# Patient Record
Sex: FEMALE | Race: WHITE | NOT HISPANIC OR LATINO
[De-identification: names, ages, dates, MRNs, and addresses within clinical notes are randomized per-mention and may not be internally consistent; named-entity substitution may affect disease eponyms.]

---

## 2017-06-22 ENCOUNTER — APPOINTMENT (OUTPATIENT)
Dept: CARDIOLOGY | Facility: CLINIC | Age: 82
End: 2017-06-22

## 2017-07-13 ENCOUNTER — APPOINTMENT (OUTPATIENT)
Dept: CARDIOLOGY | Facility: CLINIC | Age: 82
End: 2017-07-13

## 2017-10-19 ENCOUNTER — APPOINTMENT (OUTPATIENT)
Dept: CARDIOLOGY | Facility: CLINIC | Age: 82
End: 2017-10-19
Payer: MEDICARE

## 2017-10-19 PROCEDURE — 99214 OFFICE O/P EST MOD 30 MIN: CPT

## 2018-05-14 ENCOUNTER — RECORD ABSTRACTING (OUTPATIENT)
Age: 83
End: 2018-05-14

## 2018-05-16 ENCOUNTER — RECORD ABSTRACTING (OUTPATIENT)
Age: 83
End: 2018-05-16

## 2018-05-16 ENCOUNTER — APPOINTMENT (OUTPATIENT)
Dept: CARDIOLOGY | Facility: CLINIC | Age: 83
End: 2018-05-16
Payer: MEDICARE

## 2018-05-16 ENCOUNTER — NON-APPOINTMENT (OUTPATIENT)
Age: 83
End: 2018-05-16

## 2018-05-16 VITALS — DIASTOLIC BLOOD PRESSURE: 80 MMHG | SYSTOLIC BLOOD PRESSURE: 130 MMHG

## 2018-05-16 VITALS
HEART RATE: 60 BPM | BODY MASS INDEX: 21.86 KG/M2 | HEIGHT: 66 IN | WEIGHT: 136 LBS | SYSTOLIC BLOOD PRESSURE: 140 MMHG | DIASTOLIC BLOOD PRESSURE: 84 MMHG | OXYGEN SATURATION: 97 %

## 2018-05-16 DIAGNOSIS — Z87.891 PERSONAL HISTORY OF NICOTINE DEPENDENCE: ICD-10-CM

## 2018-05-16 DIAGNOSIS — Z87.898 PERSONAL HISTORY OF OTHER SPECIFIED CONDITIONS: ICD-10-CM

## 2018-05-16 DIAGNOSIS — Z83.49 FAMILY HISTORY OF OTHER ENDOCRINE, NUTRITIONAL AND METABOLIC DISEASES: ICD-10-CM

## 2018-05-16 DIAGNOSIS — Z78.9 OTHER SPECIFIED HEALTH STATUS: ICD-10-CM

## 2018-05-16 DIAGNOSIS — Z87.19 PERSONAL HISTORY OF OTHER DISEASES OF THE DIGESTIVE SYSTEM: ICD-10-CM

## 2018-05-16 PROCEDURE — 99214 OFFICE O/P EST MOD 30 MIN: CPT

## 2018-05-16 PROCEDURE — 93000 ELECTROCARDIOGRAM COMPLETE: CPT

## 2018-10-09 ENCOUNTER — APPOINTMENT (OUTPATIENT)
Dept: CARDIOLOGY | Facility: CLINIC | Age: 83
End: 2018-10-09
Payer: MEDICARE

## 2018-10-09 VITALS
SYSTOLIC BLOOD PRESSURE: 130 MMHG | BODY MASS INDEX: 22.5 KG/M2 | HEART RATE: 60 BPM | WEIGHT: 140 LBS | DIASTOLIC BLOOD PRESSURE: 70 MMHG | HEIGHT: 66 IN

## 2018-10-09 PROCEDURE — 99214 OFFICE O/P EST MOD 30 MIN: CPT

## 2019-05-08 ENCOUNTER — APPOINTMENT (OUTPATIENT)
Dept: CARDIOLOGY | Facility: CLINIC | Age: 84
End: 2019-05-08

## 2019-05-22 ENCOUNTER — APPOINTMENT (OUTPATIENT)
Dept: CARDIOLOGY | Facility: CLINIC | Age: 84
End: 2019-05-22

## 2019-07-11 ENCOUNTER — APPOINTMENT (OUTPATIENT)
Dept: CARDIOLOGY | Facility: CLINIC | Age: 84
End: 2019-07-11
Payer: MEDICARE

## 2019-07-11 ENCOUNTER — NON-APPOINTMENT (OUTPATIENT)
Age: 84
End: 2019-07-11

## 2019-07-11 VITALS
WEIGHT: 138 LBS | DIASTOLIC BLOOD PRESSURE: 76 MMHG | BODY MASS INDEX: 21.66 KG/M2 | OXYGEN SATURATION: 97 % | SYSTOLIC BLOOD PRESSURE: 154 MMHG | HEIGHT: 67 IN | HEART RATE: 68 BPM

## 2019-07-11 VITALS — SYSTOLIC BLOOD PRESSURE: 132 MMHG | DIASTOLIC BLOOD PRESSURE: 80 MMHG

## 2019-07-11 PROCEDURE — 93000 ELECTROCARDIOGRAM COMPLETE: CPT

## 2019-07-11 PROCEDURE — 99214 OFFICE O/P EST MOD 30 MIN: CPT

## 2019-07-11 NOTE — HISTORY OF PRESENT ILLNESS
[FreeTextEntry1] : Medical History:\par Anxiety\par GERD\par shortness of breath\par Chest Pain\par borderline htn on life style modification\par hyperlipidemia not on statin. her choice.

## 2019-07-11 NOTE — DISCUSSION/SUMMARY
[FreeTextEntry1] : #1 Intermittent dizziness,with orthostasis.\par Increase fluid intake.\par CBC, CMP, TSH.\par Echocardiogram and carotid Doppler study.\par Avoid quick changes in position.\par Continuing anxiety/stress management.\par Continue to have comfort with her dog\par  evaluation, management with primary care physician in psychologist\par #2 essential hypertension. Borderline.Orthostasis noted. I have not added any other medications at present to avoid any fall, and associated morbid rheumatology. We will treat with review it after about tests in 2-3 months to decide about further management.\par Continue increased fluid intake.\par Continue salt and alcohol restriction.Understands relationship with hypertension and cardiovascular disease.\par #3 hyperlipidemia.   she Has decided again statin therapy.  we can discuss the risk and benefits again.\par \par \par All the above were at length reviewed. Answered all the questions. Thank you very much for this kind referral. Please do not hesitate to give me a call for any question.\par Part of this transcription was done with voice recognition software and phonetically similar errors are common. I apologize for that. Please donot hesitate to call for any questions due to above.\par Followup is planned otherwise in 6 months. Mucosal Advancement Flap Text: Given the location of the defect, shape of the defect and the proximity to free margins a mucosal advancement flap was deemed most appropriate. Incisions were made with a 15 blade scalpel in the appropriate fashion along the cutaneous vermillion border and the mucosal lip. The remaining actinically damaged mucosal tissue was excised.  The mucosal advancement flap was then elevated to the gingival sulcus with care taken to preserve the neurovascular structures and advanced into the primary defect. Care was taken to ensure that precise realignment of the vermilion border was achieved.

## 2019-07-11 NOTE — ASSESSMENT
[FreeTextEntry1] : past tests for reference:\par \par Nuclear stress test on May 27, 2014, no evidence of ischemia by EKG, normal myocardial perfusion and wall motion, no significant changes from the prior study of July 27, 2012.\par \par The patient was fitted with a 30-day event monitor to identify the arrhythmia burden. This was completed and revealed one episode of nonsustained ventricular tachycardia at the rate of 118 times 6 beats as well as one episode of paroxysmal supraventricular tachycardia also 5 to 6 beats, which was asymptomatic for the patient. There were otherwise isolated PVCs and APCs of no significance. The patient did not have any symptoms accompanying her nonsustained VT or PSVT. The monitoring period was performed between May 22, 2014 and June 23, 2014.\par \par 7/13/17 Carotid Doppler study mild atherosclerosis. Nonobstructive.\par 7/13/17 Echocardiogram preserved ejection fraction trace to mild mitral regurgitation. Normal chamber dimensions.\par 7/13/17 Abdominal aortic ultrasound. No aneurysm.\par \par Reviewed on May 16, 2018.\par EKG ordered and interpreted by me. May 16, 2018. Indication dizziness. Hypertension. Interpretation. In normal sinus rhythm. Incomplete right bundle branch block.\par \par Reviewed on July 11, 2019\par EKG normal sinus rhythm. Left anterior hemiblock, incomplete right bundle branch block.

## 2019-07-11 NOTE — PHYSICAL EXAM
[General Appearance - Well Developed] : well developed [Normal Appearance] : normal appearance [Well Groomed] : well groomed [General Appearance - Well Nourished] : well nourished [No Deformities] : no deformities [General Appearance - In No Acute Distress] : no acute distress [Normal Conjunctiva] : the conjunctiva exhibited no abnormalities [No Oral Pallor] : no oral pallor [Eyelids - No Xanthelasma] : the eyelids demonstrated no xanthelasmas [Normal Jugular Venous A Waves Present] : normal jugular venous A waves present [Normal Jugular Venous V Waves Present] : normal jugular venous V waves present [No Jugular Venous Cooper A Waves] : no jugular venous cooper A waves [] : no respiratory distress [Exaggerated Use Of Accessory Muscles For Inspiration] : no accessory muscle use [Respiration, Rhythm And Depth] : normal respiratory rhythm and effort [Auscultation Breath Sounds / Voice Sounds] : lungs were clear to auscultation bilaterally [Arterial Pulses Normal] : the arterial pulses were normal [Heart Sounds] : normal S1 and S2 [Heart Rate And Rhythm] : heart rate and rhythm were normal [Veins - Varicosity Changes] : no varicosital changes were noted in the lower extremities [Edema] : no peripheral edema present [Abdomen Soft] : soft [Abnormal Walk] : normal gait [Nail Clubbing] : no clubbing of the fingernails [Oriented To Time, Place, And Person] : oriented to person, place, and time [Cyanosis, Localized] : no localized cyanosis [Skin Color & Pigmentation] : normal skin color and pigmentation [Affect] : the affect was normal [Mood] : the mood was normal [No Anxiety] : not feeling anxious [FreeTextEntry1] : abimbola 1-2/6 at the base, no gallop/rub/heave or click. no bruits.

## 2019-07-11 NOTE — REVIEW OF SYSTEMS
[Dizziness] : dizziness [Negative] : Endocrine [Tremor] : no tremor was seen [Numbness (Hypesthesia)] : no numbness [Tingling (Paresthesia)] : no tingling [Convulsions] : no convulsions [see HPI] : see HPI

## 2019-07-11 NOTE — REASON FOR VISIT
[Follow-Up - Clinic] : a clinic follow-up of [Dizziness] : dizziness [Hyperlipidemia] : hyperlipidemia [Hypertension] : hypertension [FreeTextEntry1] : 87-year-old female comes in for urgent consultation in presence of intermittent mild dizziness mainly in the morning hours after breakfast without any associated vertigo, nausea, palpitations, visual disturbances, focal weakness, near syncopal or syncopal event.\par Loss of a few seconds resolves on its own. She does drink 4-6 glasses of water a day.\par She is very anxious about her multiple social situations.\par She is using her dog as her comfort and anxiety management to which is working according to her.\par  No weight loss. No PND. No orthopnea.\par She has been active\par Stable. Weight.\par No recent hospital admit

## 2019-08-07 ENCOUNTER — APPOINTMENT (OUTPATIENT)
Dept: CARDIOLOGY | Facility: CLINIC | Age: 84
End: 2019-08-07
Payer: MEDICARE

## 2019-08-07 ENCOUNTER — NON-APPOINTMENT (OUTPATIENT)
Age: 84
End: 2019-08-07

## 2019-08-07 VITALS
OXYGEN SATURATION: 99 % | WEIGHT: 139 LBS | HEIGHT: 67.25 IN | HEART RATE: 58 BPM | BODY MASS INDEX: 21.56 KG/M2 | SYSTOLIC BLOOD PRESSURE: 120 MMHG | DIASTOLIC BLOOD PRESSURE: 74 MMHG

## 2019-08-07 PROCEDURE — 0296T: CPT

## 2019-08-07 PROCEDURE — 99214 OFFICE O/P EST MOD 30 MIN: CPT

## 2019-08-07 NOTE — PHYSICAL EXAM
[Normal Appearance] : normal appearance [General Appearance - Well Developed] : well developed [Well Groomed] : well groomed [No Deformities] : no deformities [General Appearance - Well Nourished] : well nourished [General Appearance - In No Acute Distress] : no acute distress [Normal Conjunctiva] : the conjunctiva exhibited no abnormalities [No Oral Pallor] : no oral pallor [Eyelids - No Xanthelasma] : the eyelids demonstrated no xanthelasmas [Normal Jugular Venous V Waves Present] : normal jugular venous V waves present [No Jugular Venous Cooper A Waves] : no jugular venous cooper A waves [Normal Jugular Venous A Waves Present] : normal jugular venous A waves present [Respiration, Rhythm And Depth] : normal respiratory rhythm and effort [] : no respiratory distress [Auscultation Breath Sounds / Voice Sounds] : lungs were clear to auscultation bilaterally [Exaggerated Use Of Accessory Muscles For Inspiration] : no accessory muscle use [Heart Rate And Rhythm] : heart rate and rhythm were normal [Heart Sounds] : normal S1 and S2 [Arterial Pulses Normal] : the arterial pulses were normal [Edema] : no peripheral edema present [Veins - Varicosity Changes] : no varicosital changes were noted in the lower extremities [Abdomen Soft] : soft [Abnormal Walk] : normal gait [Nail Clubbing] : no clubbing of the fingernails [Cyanosis, Localized] : no localized cyanosis [Oriented To Time, Place, And Person] : oriented to person, place, and time [Affect] : the affect was normal [Skin Color & Pigmentation] : normal skin color and pigmentation [No Anxiety] : not feeling anxious [Mood] : the mood was normal [FreeTextEntry1] : abimbola 1-2/6 at the base, no gallop/rub/heave or click. no bruits.

## 2019-08-07 NOTE — REASON FOR VISIT
[Follow-Up - Clinic] : a clinic follow-up of [Dizziness] : dizziness [Hypertension] : hypertension [Hyperlipidemia] : hyperlipidemia [FreeTextEntry1] : 87-year-old female comes in for urgent consultation in presence of intermittent mild dizziness mainly in the morning hours after breakfast without any associated vertigo, nausea, palpitations, visual disturbances, focal weakness, near syncopal or syncopal event. Patient was evaluated at Dr. Smith's office on Aug 3, 2019 for similar symptoms and with Dr. Llanes on July 11, 2019. She was advised to have labs and cardiac testing which has not been completed yet. \par \par Today she denies chest pain, pressure, unusual shortness of breath, lightheadedness, dizziness, near syncope or syncope. \par \par She is questing if she drinks enough fluid throughout the day.\par She is very anxious about her multiple social situations.\par She is using her dog as her comfort and anxiety management to which is working according to her.\par  No weight loss. No PND. No orthopnea.\par She has been active\par Stable. Weight.\par No recent hospital admit\par \par There is no prior history of myocardial infarction, coronary revascularization, history of ischemic heart disease, or symptomatic congestive heart failure. There is no history of symptomatic arrhythmias including atrial fibrillation.

## 2019-08-07 NOTE — REVIEW OF SYSTEMS
[Dizziness] : dizziness [see HPI] : see HPI [Negative] : Heme/Lymph [Tremor] : no tremor was seen [Numbness (Hypesthesia)] : no numbness [Convulsions] : no convulsions [Tingling (Paresthesia)] : no tingling

## 2019-08-07 NOTE — DISCUSSION/SUMMARY
[FreeTextEntry1] : #1 Intermittent dizziness. On my examination BP sitting and standing 140/80.\par No evidence of orthostatic hypotension. \par Increase fluid intake.\par Once again it has been recommended CBC, CMP, TSH.\par Echocardiogram and carotid Doppler study as previously recommended. \par Avoid quick changes in position.\par Continuing anxiety/stress management.\par Continue to have comfort with her dog\par  evaluation, management with primary care physician in psychologist\par \par #2 essential hypertension. Borderline.Orthostasis noted. I have not added any other medications at present to avoid any fall, and associated morbid rheumatology. As per last note it will be addressed after tests in 2-3 months to decide about further management.\par Continue increased fluid intake.\par Continue salt and alcohol restriction.Understands relationship with hypertension and cardiovascular disease.\par \par #3 hyperlipidemia.   she Has decided again statin therapy.  we can discuss the risk and benefits again.\par \par Above cardiovascular testing reviewed. \par \par Red flag symptoms which would warrant sooner emergent evaluation reviewed with the patient. \par Questions and concerns were addressed and answered. \par \par Sincerely,\par \par Tiffany Manning PA-C\par Patients history, testing and plan reviewed with supervising MD: Dr. Arley Llanes

## 2019-08-26 ENCOUNTER — APPOINTMENT (OUTPATIENT)
Dept: CARDIOLOGY | Facility: CLINIC | Age: 84
End: 2019-08-26
Payer: MEDICARE

## 2019-08-26 PROCEDURE — 93306 TTE W/DOPPLER COMPLETE: CPT

## 2019-08-26 PROCEDURE — 93880 EXTRACRANIAL BILAT STUDY: CPT

## 2019-08-27 ENCOUNTER — RESULT CHARGE (OUTPATIENT)
Age: 84
End: 2019-08-27

## 2019-08-30 PROCEDURE — 0298T: CPT

## 2019-09-03 ENCOUNTER — APPOINTMENT (OUTPATIENT)
Dept: CARDIOLOGY | Facility: CLINIC | Age: 84
End: 2019-09-03
Payer: MEDICARE

## 2019-09-03 VITALS
DIASTOLIC BLOOD PRESSURE: 76 MMHG | HEART RATE: 51 BPM | SYSTOLIC BLOOD PRESSURE: 120 MMHG | OXYGEN SATURATION: 97 % | WEIGHT: 137 LBS | HEIGHT: 67 IN | BODY MASS INDEX: 21.5 KG/M2

## 2019-09-03 PROCEDURE — 99215 OFFICE O/P EST HI 40 MIN: CPT

## 2019-09-03 PROCEDURE — 0296T: CPT

## 2019-09-03 RX ORDER — AMLODIPINE BESYLATE 2.5 MG/1
2.5 TABLET ORAL
Qty: 30 | Refills: 0 | Status: DISCONTINUED | COMMUNITY
Start: 2019-06-14 | End: 2019-09-03

## 2019-09-03 NOTE — REASON FOR VISIT
[Follow-Up - Clinic] : a clinic follow-up of [Dizziness] : dizziness [Hyperlipidemia] : hyperlipidemia [Hypertension] : hypertension [FreeTextEntry1] : 87-year-old female comes in for followup consultation review echocardiogram, carotid Doppler study, event recorder findings and therapeutic response to medications.\par Since starting metoprolol. She offers no further dizziness.She has no near syncopal or syncopal event\par She is very anxious about her multiple social situations.\par She is using her dog as her comfort and anxiety management to which is working according to her.\par  No weight loss. \par She has no chest pain. No significant palpitations.No PND. No orthopnea.\par She has been active\par Stable. Weight.\par No recent hospital admit

## 2019-09-03 NOTE — REVIEW OF SYSTEMS
[Dizziness] : dizziness [Tremor] : no tremor was seen [Numbness (Hypesthesia)] : no numbness [Convulsions] : no convulsions [Tingling (Paresthesia)] : no tingling [see HPI] : see HPI [Negative] : Heme/Lymph

## 2019-09-03 NOTE — ASSESSMENT
[FreeTextEntry1] : past tests for reference:\par \par Nuclear stress test on May 27, 2014, no evidence of ischemia by EKG, normal myocardial perfusion and wall motion, no significant changes from the prior study of July 27, 2012.\par \par The patient was fitted with a 30-day event monitor to identify the arrhythmia burden. This was completed and revealed one episode of nonsustained ventricular tachycardia at the rate of 118 times 6 beats as well as one episode of paroxysmal supraventricular tachycardia also 5 to 6 beats, which was asymptomatic for the patient. There were otherwise isolated PVCs and APCs of no significance. The patient did not have any symptoms accompanying her nonsustained VT or PSVT. The monitoring period was performed between May 22, 2014 and June 23, 2014.\par \par 7/13/17 Carotid Doppler study mild atherosclerosis. Nonobstructive.\par 7/13/17 Echocardiogram preserved ejection fraction trace to mild mitral regurgitation. Normal chamber dimensions.\par 7/13/17 Abdominal aortic ultrasound. No aneurysm.\par \par Reviewed on May 16, 2018.\par EKG ordered and interpreted by me. May 16, 2018. Indication dizziness. Hypertension. Interpretation. In normal sinus rhythm. Incomplete right bundle branch block.\par \par Reviewed on July 11, 2019\par EKG normal sinus rhythm. Left anterior hemiblock, incomplete right bundle branch block.\par \par Reviewed on September 3, 2019\par Carotid Doppler study August 26, 2019 right ICA 50-69%.\par Echocardiogram August 26, 2008, ejection fraction 60%. Ascending aorta, 2.8 cm, normal left atrium minimal mitral regurgitation.\par 2 weeks, event recorder, multiple episodes of supraventricular tachycardia. The fastest was 19 beats at 261 beats per minute, lasting for about 14.8 seconds.

## 2019-09-03 NOTE — HISTORY OF PRESENT ILLNESS
[FreeTextEntry1] : Medical History:\par Anxiety\par GERD\par shortness of breath\par Chest Pain\par borderline htn on life style modification\par hyperlipidemia not on statin. her choice.\par PSVT

## 2019-09-03 NOTE — PHYSICAL EXAM
[General Appearance - Well Developed] : well developed [Normal Appearance] : normal appearance [Well Groomed] : well groomed [General Appearance - Well Nourished] : well nourished [No Deformities] : no deformities [General Appearance - In No Acute Distress] : no acute distress [Normal Conjunctiva] : the conjunctiva exhibited no abnormalities [Eyelids - No Xanthelasma] : the eyelids demonstrated no xanthelasmas [No Oral Pallor] : no oral pallor [Normal Jugular Venous A Waves Present] : normal jugular venous A waves present [Normal Jugular Venous V Waves Present] : normal jugular venous V waves present [No Jugular Venous Cooper A Waves] : no jugular venous cooper A waves [] : no respiratory distress [Respiration, Rhythm And Depth] : normal respiratory rhythm and effort [Exaggerated Use Of Accessory Muscles For Inspiration] : no accessory muscle use [Auscultation Breath Sounds / Voice Sounds] : lungs were clear to auscultation bilaterally [Heart Rate And Rhythm] : heart rate and rhythm were normal [Heart Sounds] : normal S1 and S2 [Arterial Pulses Normal] : the arterial pulses were normal [Edema] : no peripheral edema present [Veins - Varicosity Changes] : no varicosital changes were noted in the lower extremities [FreeTextEntry1] : abimbola 1-2/6 at the base, no gallop/rub/heave or click. no bruits. [Abdomen Soft] : soft [Abnormal Walk] : normal gait [Nail Clubbing] : no clubbing of the fingernails [Cyanosis, Localized] : no localized cyanosis [Skin Color & Pigmentation] : normal skin color and pigmentation [Oriented To Time, Place, And Person] : oriented to person, place, and time [Mood] : the mood was normal [Affect] : the affect was normal [No Anxiety] : not feeling anxious

## 2019-09-03 NOTE — DISCUSSION/SUMMARY
[FreeTextEntry1] : #1Dizziness. His old on metoprolol, and presence of findings of rapid paroxysmal supraventricular tachycardia. Possible response to metoprolol. She is preserved. Ejection fraction. No signs of possible CAD. She has mild to moderate right ICA stenosis unlikely to be the reason for her symptoms.\par I do not have labs which were done in your office. I requested them at present.\par Continue metoprolol.\par 2 evaluate therapeutic response to metoprolol. I have recommended her to have 2 weeks event recorder again.\par Considering significant and rapid ventricular rate during her tachycardia. I have also recommended to her to be seen by electrophysiologist.\par If any recurrent symptoms she will call 911.\par We have discussed possible need for radiofrequency ablation/permanent pacemaker.\par #2 essential hypertension.Her blood pressure is stable off, amlodipine, and on metoprolol. Continue increased fluid intake goal less than 130/80.\par Continue salt and alcohol restriction.Understands relationship with hypertension and cardiovascular disease.\par #3 hyperlipidemia.   she Has decided again statin therapy.  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 I have recommended her to consider statin therapy. If LDL cholesterol is greater than 100. I requested labs from your office.\par #4 right ICA mild to moderate disease. No neurological event. Obtain lipid panel. Continue aspirin. Management of hypertension.\par \par Counseling regarding low saturated fat, salt and carbohydrate intake was reviewed. Active lifestyle and regular. Exercise along with weight management is advised.\par \par \par All the above were at length reviewed. Answered all the questions. Thank you very much for this kind referral. Please do not hesitate to give me a call for any question.\par Part of this transcription was done with voice recognition software and phonetically similar errors are common. I apologize for that. Please donot hesitate to call for any questions due to above.\par Followup is planned otherwise in 6 months.

## 2019-09-11 ENCOUNTER — APPOINTMENT (OUTPATIENT)
Dept: ELECTROPHYSIOLOGY | Facility: CLINIC | Age: 84
End: 2019-09-11
Payer: MEDICARE

## 2019-09-11 VITALS
BODY MASS INDEX: 21.66 KG/M2 | DIASTOLIC BLOOD PRESSURE: 70 MMHG | WEIGHT: 138 LBS | HEART RATE: 53 BPM | SYSTOLIC BLOOD PRESSURE: 126 MMHG | OXYGEN SATURATION: 97 % | HEIGHT: 67 IN

## 2019-09-11 PROCEDURE — 99204 OFFICE O/P NEW MOD 45 MIN: CPT

## 2019-09-11 PROCEDURE — 93000 ELECTROCARDIOGRAM COMPLETE: CPT

## 2019-09-11 NOTE — REVIEW OF SYSTEMS
[Feeling Fatigued] : not feeling fatigued [Shortness Of Breath] : no shortness of breath [Dyspnea on exertion] : not dyspnea during exertion [Chest Pain] : no chest pain [Palpitations] : no palpitations [Cough] : no cough [Abdominal Pain] : no abdominal pain [Dizziness] : dizziness [Confusion] : no confusion was observed [Anxiety] : no anxiety [Excessive Thirst] : no polydipsia [Easy Bleeding] : no tendency for easy bleeding [Easy Bruising] : no tendency for easy bruising

## 2019-09-11 NOTE — PHYSICAL EXAM
[General Appearance - Well Developed] : well developed [General Appearance - In No Acute Distress] : no acute distress [Normal Conjunctiva] : the conjunctiva exhibited no abnormalities [No Oral Pallor] : no oral pallor [FreeTextEntry1] : Faint carotid bruit on the right side [Normal Jugular Venous V Waves Present] : normal jugular venous V waves present [Heart Rate And Rhythm] : heart rate and rhythm were normal [Heart Sounds] : normal S1 and S2 [Murmurs] : no murmurs present [Arterial Pulses Normal] : the arterial pulses were normal [Edema] : no peripheral edema present [Respiration, Rhythm And Depth] : normal respiratory rhythm and effort [Auscultation Breath Sounds / Voice Sounds] : lungs were clear to auscultation bilaterally [Abdomen Tenderness] : non-tender [Abnormal Walk] : normal gait [Nail Clubbing] : no clubbing of the fingernails [Cyanosis, Localized] : no localized cyanosis [No Venous Stasis] : no venous stasis [Impaired Insight] : insight and judgment were intact

## 2019-09-11 NOTE — HISTORY OF PRESENT ILLNESS
[FreeTextEntry1] : \par Dear Dr. Arley Llanes:\par I  had a pleasure of seeing your patient in consultation.  As you know she is an 87-year-old woman who had recent episode of dizziness.  She was noted to have a rapid supraventricular tachycardia on a Zio patch. She had a Zio patch placed for 12 days between August 12, 2019 and August 24, 2019.  It showed an nonsustainedepisodes ( < 3 seconds) of SVT about 180 -200 bpm.  The patient was not on beta-blocker at that time.  She also had episodes of atrial tachycardia at about 140 to 150 bpm these were all nonsustained short episodes.  There were also isolated PVCs. She was started on metoprolol approximately 10 days ago and since then has been feeling well.\par \par \par  She has a second monitor placed for another 10 to 12 days and the results are not available yet she just returned to monitor today.\par \par \par Patient had an echocardiogram performed August 26, 2019 that showed ejection fraction greater than 50% mild mitral regurgitation normal left atrial size normal pulmonary pressures.\par \par She has not had syncope presyncope or chest pain.

## 2019-09-13 ENCOUNTER — APPOINTMENT (OUTPATIENT)
Dept: CARDIOLOGY | Facility: CLINIC | Age: 84
End: 2019-09-13
Payer: MEDICARE

## 2019-09-13 ENCOUNTER — NON-APPOINTMENT (OUTPATIENT)
Age: 84
End: 2019-09-13

## 2019-09-13 VITALS
WEIGHT: 138 LBS | BODY MASS INDEX: 21.66 KG/M2 | HEART RATE: 58 BPM | SYSTOLIC BLOOD PRESSURE: 132 MMHG | DIASTOLIC BLOOD PRESSURE: 70 MMHG | OXYGEN SATURATION: 97 % | HEIGHT: 67 IN

## 2019-09-13 PROCEDURE — 93000 ELECTROCARDIOGRAM COMPLETE: CPT

## 2019-09-13 PROCEDURE — 99214 OFFICE O/P EST MOD 30 MIN: CPT

## 2019-09-13 RX ORDER — ASPIRIN 81 MG
81 TABLET, DELAYED RELEASE (ENTERIC COATED) ORAL DAILY
Refills: 0 | Status: DISCONTINUED | COMMUNITY
End: 2019-09-13

## 2019-09-13 NOTE — HISTORY OF PRESENT ILLNESS
[FreeTextEntry1] : She presents today complaining of dizzy spells. No syncope. No palpitations. She admits not to hydrate enough. She did not return second part of Zio patch yet\par \par Patient is an 87-year-old woman who had recent episode of dizziness.  She was noted to have a rapid supraventricular tachycardia on a Zio patch. She had a Zio patch placed for 12 days between August 12, 2019 and August 24, 2019.  It showed an nonsustainedepisodes ( < 3 seconds) of SVT about 180 -200 bpm.  The patient was not on beta-blocker at that time.  She also had episodes of atrial tachycardia at about 140 to 150 bpm these were all nonsustained short episodes.  There were also isolated PVCs. She was started on metoprolol approximately and since then has been feeling well.\par \par \par  She has a second monitor placed for another 10 to 12 days and the results are not available yet she just returned to monitor today.\par \par \par Patient had an echocardiogram performed August 26, 2019 that showed ejection fraction greater than 50% mild mitral regurgitation normal left atrial size normal pulmonary pressures.\par \par She has not had syncope presyncope or chest pain.

## 2019-09-13 NOTE — REVIEW OF SYSTEMS
[Dizziness] : dizziness [Shortness Of Breath] : no shortness of breath [Feeling Fatigued] : not feeling fatigued [Palpitations] : no palpitations [Chest Pain] : no chest pain [Dyspnea on exertion] : not dyspnea during exertion [Abdominal Pain] : no abdominal pain [Cough] : no cough [Confusion] : no confusion was observed [Anxiety] : no anxiety [Excessive Thirst] : no polydipsia [Easy Bleeding] : no tendency for easy bleeding [Easy Bruising] : no tendency for easy bruising

## 2019-09-13 NOTE — DISCUSSION/SUMMARY
[FreeTextEntry1] : Patient is an 87-year-old woman who had symptoms of dizziness and event monitor showed an SVT that was nonsustained to rapid rates of 180 to 200 bpm.  Analysis of the strips consistent with an atrial tachycardia.  She also had slower episodes of atrial tachycardia.  Patient has since been placed on beta-blocker therapy and is doing well.  She has not demonstrated any significant bradycardia at this time.  She has a repeat monitor placed which was not returned yet.  \par Patient had episodes of dizziness and lightheadedness yesterday. She admits no drinking enough fluids. She transplanted fluids today and is feeling better. Continue vigorous hydration. She is in cartilage to return monitor today so we can review the results. Followup next week to review monitor.

## 2019-09-16 PROCEDURE — 0298T: CPT

## 2019-09-24 ENCOUNTER — APPOINTMENT (OUTPATIENT)
Dept: CARDIOLOGY | Facility: CLINIC | Age: 84
End: 2019-09-24
Payer: MEDICARE

## 2019-09-24 PROCEDURE — A9502: CPT

## 2019-09-24 PROCEDURE — 78452 HT MUSCLE IMAGE SPECT MULT: CPT

## 2019-09-24 PROCEDURE — 93015 CV STRESS TEST SUPVJ I&R: CPT

## 2019-09-27 ENCOUNTER — APPOINTMENT (OUTPATIENT)
Dept: ELECTROPHYSIOLOGY | Facility: CLINIC | Age: 84
End: 2019-09-27

## 2019-10-02 ENCOUNTER — APPOINTMENT (OUTPATIENT)
Dept: CARDIOLOGY | Facility: CLINIC | Age: 84
End: 2019-10-02
Payer: MEDICARE

## 2019-10-02 VITALS
SYSTOLIC BLOOD PRESSURE: 118 MMHG | DIASTOLIC BLOOD PRESSURE: 68 MMHG | HEIGHT: 67 IN | HEART RATE: 61 BPM | BODY MASS INDEX: 21.66 KG/M2 | OXYGEN SATURATION: 96 % | WEIGHT: 138 LBS

## 2019-10-02 PROCEDURE — 99214 OFFICE O/P EST MOD 30 MIN: CPT

## 2019-10-02 NOTE — PHYSICAL EXAM
[General Appearance - Well Developed] : well developed [Well Groomed] : well groomed [Normal Appearance] : normal appearance [General Appearance - Well Nourished] : well nourished [No Deformities] : no deformities [General Appearance - In No Acute Distress] : no acute distress [Normal Conjunctiva] : the conjunctiva exhibited no abnormalities [Eyelids - No Xanthelasma] : the eyelids demonstrated no xanthelasmas [No Oral Pallor] : no oral pallor [Normal Jugular Venous V Waves Present] : normal jugular venous V waves present [] : no respiratory distress [Respiration, Rhythm And Depth] : normal respiratory rhythm and effort [Exaggerated Use Of Accessory Muscles For Inspiration] : no accessory muscle use [Heart Rate And Rhythm] : heart rate and rhythm were normal [Heart Sounds] : normal S1 and S2 [Arterial Pulses Normal] : the arterial pulses were normal [Edema] : no peripheral edema present [Veins - Varicosity Changes] : no varicosital changes were noted in the lower extremities [Abnormal Walk] : normal gait [Cyanosis, Localized] : no localized cyanosis [Nail Clubbing] : no clubbing of the fingernails [Skin Color & Pigmentation] : normal skin color and pigmentation [Oriented To Time, Place, And Person] : oriented to person, place, and time [Affect] : the affect was normal [Mood] : the mood was normal [No Anxiety] : not feeling anxious [FreeTextEntry1] : abimbola 1-2/6 at the base, no gallop/rub/heave or click. no bruits.

## 2019-10-02 NOTE — ASSESSMENT
[FreeTextEntry1] : past tests for reference:\par \par Nuclear stress test on May 27, 2014, no evidence of ischemia by EKG, normal myocardial perfusion and wall motion, no significant changes from the prior study of July 27, 2012.\par \par The patient was fitted with a 30-day event monitor to identify the arrhythmia burden. This was completed and revealed one episode of nonsustained ventricular tachycardia at the rate of 118 times 6 beats as well as one episode of paroxysmal supraventricular tachycardia also 5 to 6 beats, which was asymptomatic for the patient. There were otherwise isolated PVCs and APCs of no significance. The patient did not have any symptoms accompanying her nonsustained VT or PSVT. The monitoring period was performed between May 22, 2014 and June 23, 2014.\par \par 7/13/17 Carotid Doppler study mild atherosclerosis. Nonobstructive.\par 7/13/17 Echocardiogram preserved ejection fraction trace to mild mitral regurgitation. Normal chamber dimensions.\par 7/13/17 Abdominal aortic ultrasound. No aneurysm.\par \par Reviewed on May 16, 2018.\par EKG ordered and interpreted by me. May 16, 2018. Indication dizziness. Hypertension. Interpretation. In normal sinus rhythm. Incomplete right bundle branch block.\par \par Reviewed on July 11, 2019\par EKG normal sinus rhythm. Left anterior hemiblock, incomplete right bundle branch block.\par \par Reviewed on September 3, 2019\par Carotid Doppler study August 26, 2019 right ICA 50-69%.\par Echocardiogram August 26, 2008, ejection fraction 60%. Ascending aorta, 2.8 cm, normal left atrium minimal mitral regurgitation.\par 2 weeks, event recorder, multiple episodes of supraventricular tachycardia. The fastest was 19 beats at 261 beats per minute, lasting for about 14.8 seconds.\par \par Reviewed on October 2, 2019.\par Labs from August 27, 2019 showed stable. CBC, CMP, lipid panel, TSH, iron studies, B12 level, lyme titers.\par Repeat event recorder showed short runs of paroxysmal atrial tachycardia with aberrancy versus NSVt. Asymptomatic.\par Nuclear marker perfusion scan September 24, 2019 she was able to do 5 minutes and 30 seconds of [place] protocol. 95% maximal heart rate. 3 METs of workload. Nonischemic perfusion scan overall. No significant ventricular arrhythmias with exercise.\par

## 2019-10-02 NOTE — REVIEW OF SYSTEMS
[Dizziness] : dizziness [see HPI] : see HPI [Negative] : Cardiovascular [Tremor] : no tremor was seen [Numbness (Hypesthesia)] : no numbness [Tingling (Paresthesia)] : no tingling [Convulsions] : no convulsions

## 2019-10-02 NOTE — DISCUSSION/SUMMARY
[FreeTextEntry1] : #1Dizziness. Resolved. If recurrence she would benefit from electrophysiology guided therapy. Continue hydration, and metoprolol. Episodes of\par PSVT, NSVT, atrial tachycardia on metoprolol. She has preserved LV systolic function and relatively low risk myocardial perfusion scan.\par Possible etiology of her symptoms were discussed.\par Options available and associated risk and benefits were reviewed.\par Any significant worsening. She will contact me otherwise I will see her when she comes back from [place]\par We have discussed possible need for radiofrequency ablation/permanent pacemaker.\par #2 essential hypertension.Her blood pressure is stable off, amlodipine, and on metoprolol. Stable without orthostasis. Continue metoprolol and increase fluid intake. Low salt diet.\par Continue salt and alcohol restriction.Understands relationship with hypertension and cardiovascular disease.\par #3 hyperlipidemia.   she Has decided again statin therapy.  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 No neurological event. Continue aspirin. Management of hypertension. She has decided against taking therapy \par \par Counseling regarding low saturated fat, salt and carbohydrate intake was reviewed. Active lifestyle and regular. Exercise along with weight management is advised.\par \par All the above were at length reviewed. Answered all the questions. Thank you very much for this kind referral. Please do not hesitate to give me a call for any question.\par Part of this transcription was done with voice recognition software and phonetically similar errors are common. I apologize for that. Please donot hesitate to call for any questions due to above.\par Followup is planned otherwise in 6 months.

## 2019-10-02 NOTE — REASON FOR VISIT
[Follow-Up - Clinic] : a clinic follow-up of [Dizziness] : dizziness [Hyperlipidemia] : hyperlipidemia [Hypertension] : hypertension [Abnormal ECG] : an abnormal ECG [Abnormal Test Result] : an abnormal test result [Medication Management] : Medication management [Family Member] : family member [FreeTextEntry1] : 87-year-old female is seen with her family member in the office. Review of her cardiovascular tests\par She has not had any further dizziness, lightheadedness, near syncopal event seems increase food intake, and taking the Toprol.\par Her event recorder had shown short episodes of atrial tachycardia with aberrancy, and NSVT,\par Her activity level is stable.\par She has no complaint of chest pain at present. No PND, orthopnea, pedal edema.\par Her weight is stable.\par Note recent hospitalization.\par Followup by electrophysiologist reviewed.

## 2019-10-02 NOTE — HISTORY OF PRESENT ILLNESS
[FreeTextEntry1] : Medical History:\par Anxiety\par GERD\par shortness of breath\par Chest Pain\par borderline htn on life style modification\par hyperlipidemia not on statin. her choice.\par PSVT/NSVT/AT

## 2019-10-17 ENCOUNTER — APPOINTMENT (OUTPATIENT)
Dept: CARDIOLOGY | Facility: CLINIC | Age: 84
End: 2019-10-17

## 2019-11-26 NOTE — HISTORY OF PRESENT ILLNESS
[FreeTextEntry1] : Medical History:\par Anxiety\par GERD\par shortness of breath\par Chest Pain\par borderline htn on life style modification\par hyperlipidemia not on statin. her choice. alert no distress c/o 6/10 left shoulder and left arm pain  pain is intermittent  also c/o intermittent left chest pain ( none now)   SR on scope skin warm color good  no c/o dizziness or SOB

## 2020-02-25 ENCOUNTER — RX RENEWAL (OUTPATIENT)
Age: 85
End: 2020-02-25

## 2020-05-14 ENCOUNTER — APPOINTMENT (OUTPATIENT)
Dept: CARDIOLOGY | Facility: CLINIC | Age: 85
End: 2020-05-14

## 2020-07-02 ENCOUNTER — NON-APPOINTMENT (OUTPATIENT)
Age: 85
End: 2020-07-02

## 2020-08-06 ENCOUNTER — NON-APPOINTMENT (OUTPATIENT)
Age: 85
End: 2020-08-06

## 2020-08-06 ENCOUNTER — APPOINTMENT (OUTPATIENT)
Dept: CARDIOLOGY | Facility: CLINIC | Age: 85
End: 2020-08-06
Payer: MEDICARE

## 2020-08-06 VITALS
TEMPERATURE: 98.3 F | HEART RATE: 62 BPM | HEIGHT: 67 IN | BODY MASS INDEX: 19.78 KG/M2 | OXYGEN SATURATION: 95 % | SYSTOLIC BLOOD PRESSURE: 148 MMHG | DIASTOLIC BLOOD PRESSURE: 70 MMHG | WEIGHT: 126 LBS

## 2020-08-06 VITALS — SYSTOLIC BLOOD PRESSURE: 136 MMHG | DIASTOLIC BLOOD PRESSURE: 78 MMHG

## 2020-08-06 DIAGNOSIS — F41.9 ANXIETY DISORDER, UNSPECIFIED: ICD-10-CM

## 2020-08-06 PROCEDURE — 99214 OFFICE O/P EST MOD 30 MIN: CPT

## 2020-08-06 PROCEDURE — 93000 ELECTROCARDIOGRAM COMPLETE: CPT

## 2020-08-06 RX ORDER — VIT A AND D3 IN COD LIVER OIL 1250-135
100 CAPSULE ORAL DAILY
Refills: 0 | Status: DISCONTINUED | COMMUNITY
End: 2020-08-06

## 2020-08-06 RX ORDER — ASPIRIN ENTERIC COATED TABLETS 81 MG 81 MG/1
81 TABLET, DELAYED RELEASE ORAL
Refills: 0 | Status: DISCONTINUED | COMMUNITY
Start: 2019-10-02 | End: 2020-08-06

## 2020-08-06 RX ORDER — ADHESIVE TAPE 3"X 2.3 YD
50 MCG TAPE, NON-MEDICATED TOPICAL
Refills: 0 | Status: DISCONTINUED | COMMUNITY
End: 2020-08-06

## 2020-08-06 RX ORDER — METOPROLOL SUCCINATE 25 MG/1
25 TABLET, EXTENDED RELEASE ORAL
Qty: 90 | Refills: 3 | Status: DISCONTINUED | COMMUNITY
Start: 2019-08-30 | End: 2020-08-06

## 2020-08-06 NOTE — ASSESSMENT
[FreeTextEntry1] : past tests for reference:\par \par Nuclear stress test on May 27, 2014, no evidence of ischemia by EKG, normal myocardial perfusion and wall motion, no significant changes from the prior study of July 27, 2012.\par \par The patient was fitted with a 30-day event monitor to identify the arrhythmia burden. This was completed and revealed one episode of nonsustained ventricular tachycardia at the rate of 118 times 6 beats as well as one episode of paroxysmal supraventricular tachycardia also 5 to 6 beats, which was asymptomatic for the patient. There were otherwise isolated PVCs and APCs of no significance. The patient did not have any symptoms accompanying her nonsustained VT or PSVT. The monitoring period was performed between May 22, 2014 and June 23, 2014.\par \par 7/13/17 Carotid Doppler study mild atherosclerosis. Nonobstructive.\par 7/13/17 Echocardiogram preserved ejection fraction trace to mild mitral regurgitation. Normal chamber dimensions.\par 7/13/17 Abdominal aortic ultrasound. No aneurysm.\par \par Reviewed on May 16, 2018.\par EKG ordered and interpreted by me. May 16, 2018. Indication dizziness. Hypertension. Interpretation. In normal sinus rhythm. Incomplete right bundle branch block.\par \par Reviewed on July 11, 2019\par EKG normal sinus rhythm. Left anterior hemiblock, incomplete right bundle branch block.\par \par Reviewed on September 3, 2019\par Carotid Doppler study August 26, 2019 right ICA 50-69%.\par Echocardiogram August 26, 2008, ejection fraction 60%. Ascending aorta, 2.8 cm, normal left atrium minimal mitral regurgitation.\par 2 weeks, event recorder, multiple episodes of supraventricular tachycardia. The fastest was 19 beats at 261 beats per minute, lasting for about 14.8 seconds.\par \par Reviewed on October 2, 2019.\par Labs from August 27, 2019 showed stable. CBC, CMP, lipid panel, TSH, iron studies, B12 level, lyme titers.\par Repeat event recorder showed short runs of paroxysmal atrial tachycardia with aberrancy versus NSVt. Asymptomatic.\par Nuclear marker perfusion scan September 24, 2019 she was able to do 5 minutes and 30 seconds of [place] protocol. 95% maximal heart rate. 3 METs of workload. Nonischemic perfusion scan overall. No significant ventricular arrhythmias with exercise.\par \par reviewed 8/6/20\par labs 7/23/20 stable cbc, cmpnegative lyme titres

## 2020-08-06 NOTE — REVIEW OF SYSTEMS
[Dizziness] : dizziness [see HPI] : see HPI [Negative] : Heme/Lymph [Tremor] : no tremor was seen [Numbness (Hypesthesia)] : no numbness [Convulsions] : no convulsions [Tingling (Paresthesia)] : no tingling [Confusion] : confusion was observed [Memory Lapses Or Loss] : no memory lapses or loss [Depression] : no depression [Anxiety] : anxiety [Under Stress] : under stress [Suicidal] : not suicidal

## 2020-08-06 NOTE — PHYSICAL EXAM
[General Appearance - Well Developed] : well developed [Normal Appearance] : normal appearance [Well Groomed] : well groomed [No Deformities] : no deformities [General Appearance - Well Nourished] : well nourished [Normal Conjunctiva] : the conjunctiva exhibited no abnormalities [General Appearance - In No Acute Distress] : no acute distress [Eyelids - No Xanthelasma] : the eyelids demonstrated no xanthelasmas [No Oral Pallor] : no oral pallor [Normal Jugular Venous V Waves Present] : normal jugular venous V waves present [] : no respiratory distress [Respiration, Rhythm And Depth] : normal respiratory rhythm and effort [Exaggerated Use Of Accessory Muscles For Inspiration] : no accessory muscle use [Heart Rate And Rhythm] : heart rate and rhythm were normal [Heart Sounds] : normal S1 and S2 [Arterial Pulses Normal] : the arterial pulses were normal [Edema] : no peripheral edema present [Veins - Varicosity Changes] : no varicosital changes were noted in the lower extremities [Abnormal Walk] : normal gait [Nail Clubbing] : no clubbing of the fingernails [Cyanosis, Localized] : no localized cyanosis [Skin Color & Pigmentation] : normal skin color and pigmentation [Oriented To Time, Place, And Person] : oriented to person, place, and time [Affect] : the affect was normal [Mood] : the mood was normal [No Anxiety] : not feeling anxious [Abdomen Soft] : soft [FreeTextEntry1] : abimbola 1-2/6 at the base, no gallop/rub/heave or click. no bruits.

## 2020-08-06 NOTE — REASON FOR VISIT
[Follow-Up - Clinic] : a clinic follow-up of [Abnormal ECG] : an abnormal ECG [Abnormal Test Result] : an abnormal test result [Hyperlipidemia] : hyperlipidemia [Dizziness] : dizziness [Hypertension] : hypertension [Medication Management] : Medication management [Family Member] : family member [FreeTextEntry1] : 88-year-old female is seen in the office for follow-up consultation.  Reviewed labs.  Recent stay in [place] with a mechanical fall.  Injury.  Also complaining of anxiety possibly associated with beta-blockers.  Mild/moderate severity.  Intermittent nature.  She has been taking her beta-blockers in the afternoon and evening hours with relatively stable symptoms.  Wants to try a different medications.\par Since last seen she has lost weight.\par She has not had any significant syncopal or near syncopal event\par No significant palpitations\par Her event recorder had shown short episodes of atrial tachycardia with aberrancy, and NSVT,\par Her activity level is stable.\par She has no complaint of chest pain at present. No PND, orthopnea, pedal edema.\par

## 2020-08-06 NOTE — DISCUSSION/SUMMARY
[FreeTextEntry1] : #1  Mechanical fall.  Anxiety/confusional states on metoprolol.  Trial of atenolol after explaining risk benefits alternatives.  If unable to take beta-blockers may need to consider calcium channel blockers as tolerated.\par #2 essential hypertension.high normal.  In presence of falls and complain of dizziness would not pursue any other medication in this elderly female.  Continue beta-blockers for management of atrial arrhythmias.  Low salt diet.\par Continue salt and alcohol restriction.Understands relationship with hypertension and cardiovascular disease.\par #3 hyperlipidemia.   she Has decided again statin therapy.  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 No neurological event. Continue aspirin. Management of hypertension. She has decided against taking therapy \par \par Counseling regarding low saturated fat, salt and carbohydrate intake was reviewed. Active lifestyle and regular. Exercise along with weight management is advised.\par \par All the above were at length reviewed. Answered all the questions. Thank you very much for this kind referral. Please do not hesitate to give me a call for any question.\par Part of this transcription was done with voice recognition software and phonetically similar errors are common. I apologize for that. Please donot hesitate to call for any questions due to above.\par Followup is planned otherwise in 2 months.

## 2020-08-12 RX ORDER — ATENOLOL 25 MG/1
25 TABLET ORAL
Qty: 90 | Refills: 3 | Status: DISCONTINUED | COMMUNITY
Start: 2020-08-06 | End: 2020-08-12

## 2020-08-18 ENCOUNTER — APPOINTMENT (OUTPATIENT)
Dept: CARDIOLOGY | Facility: CLINIC | Age: 85
End: 2020-08-18

## 2020-10-26 ENCOUNTER — APPOINTMENT (OUTPATIENT)
Dept: CARDIOLOGY | Facility: CLINIC | Age: 85
End: 2020-10-26

## 2020-10-27 ENCOUNTER — APPOINTMENT (OUTPATIENT)
Dept: CARDIOLOGY | Facility: CLINIC | Age: 85
End: 2020-10-27
Payer: MEDICARE

## 2020-10-27 VITALS
SYSTOLIC BLOOD PRESSURE: 118 MMHG | DIASTOLIC BLOOD PRESSURE: 68 MMHG | OXYGEN SATURATION: 96 % | WEIGHT: 126 LBS | HEART RATE: 68 BPM | HEIGHT: 67 IN | BODY MASS INDEX: 19.78 KG/M2

## 2020-10-27 PROCEDURE — 99213 OFFICE O/P EST LOW 20 MIN: CPT

## 2020-10-27 RX ORDER — FAMOTIDINE 20 MG/1
20 TABLET, FILM COATED ORAL
Refills: 0 | Status: DISCONTINUED | COMMUNITY
End: 2020-10-27

## 2020-10-27 NOTE — DISCUSSION/SUMMARY
[FreeTextEntry1] : 88-year-old with above medical history active medical problems as noted.  \par #1  Mechanical fall.  Anxiety/confusional states on metoprolol and atenolol.  On diltiazem at low dose with no significant palpitation and stable blood pressure and heart rate.\par #2 essential hypertension.high normal.  In presence of falls and complain of dizziness would not pursue any other medication in this elderly female.  Continue beta-blockers for management of atrial arrhythmias.  Low salt diet.\par Continue salt and alcohol restriction.Understands relationship with hypertension and cardiovascular disease.\par #3 hyperlipidemia.   she Has decided again statin therapy.  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 No neurological event. Continue aspirin. Management of hypertension. She has decided against taking therapy \par \par Counseling regarding low saturated fat, salt and carbohydrate intake was reviewed. Active lifestyle and regular. Exercise along with weight management is advised.\par \par All the above were at length reviewed. Answered all the questions. Thank you very much for this kind referral. Please do not hesitate to give me a call for any question.\par Part of this transcription was done with voice recognition software and phonetically similar errors are common. I apologize for that. Please donot hesitate to call for any questions due to above.\par Followup is planned otherwise in 6 months.

## 2020-10-27 NOTE — REASON FOR VISIT
[Follow-Up - Clinic] : a clinic follow-up of [Abnormal ECG] : an abnormal ECG [Abnormal Test Result] : an abnormal test result [Dizziness] : dizziness [Hyperlipidemia] : hyperlipidemia [Hypertension] : hypertension [Medication Management] : Medication management [FreeTextEntry1] : 88-year-old female is seen in the office for follow-up consultation.  Doing much better with lower dose of Cardizem and significantly less dizzy compared to before.  Recent stay in [place] with a mechanical fall.  Injury.  Also complaining of anxiety possibly associated with beta-blockers.  Mild/moderate severity.  Intermittent nature.  She has been taking her beta-blockers in the afternoon and evening hours with relatively stable symptoms.  Wants to try a different medications.\par Since last seen she has lost weight.\par She has not had any significant syncopal or near syncopal event\par No significant palpitations\par Her event recorder had shown short episodes of atrial tachycardia with aberrancy, and NSVT,\par Her activity level is stable.\par She has no complaint of chest pain at present. No PND, orthopnea, pedal edema.\par  [Family Member] : family member

## 2020-10-27 NOTE — PHYSICAL EXAM
[General Appearance - Well Developed] : well developed [Normal Appearance] : normal appearance [Well Groomed] : well groomed [General Appearance - Well Nourished] : well nourished [No Deformities] : no deformities [General Appearance - In No Acute Distress] : no acute distress [Normal Conjunctiva] : the conjunctiva exhibited no abnormalities [Eyelids - No Xanthelasma] : the eyelids demonstrated no xanthelasmas [] : no respiratory distress [Respiration, Rhythm And Depth] : normal respiratory rhythm and effort [Exaggerated Use Of Accessory Muscles For Inspiration] : no accessory muscle use [Heart Rate And Rhythm] : heart rate and rhythm were normal [Heart Sounds] : normal S1 and S2 [Arterial Pulses Normal] : the arterial pulses were normal [Edema] : no peripheral edema present [Veins - Varicosity Changes] : no varicosital changes were noted in the lower extremities [FreeTextEntry1] : abimbola 1-2/6 at the base, no gallop/rub/heave or click. no bruits. [Abnormal Walk] : normal gait [Nail Clubbing] : no clubbing of the fingernails [Cyanosis, Localized] : no localized cyanosis [Skin Color & Pigmentation] : normal skin color and pigmentation [Oriented To Time, Place, And Person] : oriented to person, place, and time [Affect] : the affect was normal [Mood] : the mood was normal [No Anxiety] : not feeling anxious

## 2020-10-27 NOTE — REVIEW OF SYSTEMS
[Dizziness] : no dizziness [Tremor] : no tremor was seen [Numbness (Hypesthesia)] : no numbness [Convulsions] : no convulsions [Tingling (Paresthesia)] : no tingling [see HPI] : see HPI [Confusion] : confusion was observed [Memory Lapses Or Loss] : no memory lapses or loss [Depression] : no depression [Anxiety] : anxiety [Under Stress] : under stress [Suicidal] : not suicidal [Negative] : Heme/Lymph

## 2020-11-02 NOTE — DISCUSSION/SUMMARY
[FreeTextEntry1] : Patient is an 87-year-old woman who had symptoms of dizziness and event monitor showed an SVT that was nonsustained to rapid rates of 180 to 200 bpm.  Analysis of the strips consistent with an atrial tachycardia.  She also had slower episodes of atrial tachycardia.  Patient has since been placed on beta-blocker therapy and is doing well.  She has not demonstrated any significant bradycardia at this time.  She has a repeat monitor placed which was just returned today.  She claims she felt well on the beta-blocker has not had any similar symptoms.\par \par We will review the monitor to see if she has any significant bradycardic episodes or any recurrence of tachycardia.  The patient does not want catheter ablation procedure and if she has recurrent arrhythmias or bradycardia she may benefit from pacing.  We will review the monitor as soon as the results are available.  She is feeling well and has no symptoms currently.  She will pursue evaluation of her carotids.\par \par Patient will see me in follow-up if any further SVT or bradycardia findings on the repeat monitor. Implemented All Fall with Harm Risk Interventions:  [place] to call system. Call bell, personal items and telephone within reach. Instruct patient to call for assistance. Room bathroom lighting operational. Non-slip footwear when patient is off stretcher. Physically safe environment: no spills, clutter or unnecessary equipment. Stretcher in lowest position, wheels locked, appropriate side rails in place. Provide visual cue, wrist band, yellow gown, etc. Monitor gait and stability. Monitor for mental status changes and reorient to person, place, and time. Review medications for side effects contributing to fall risk. Reinforce activity limits and safety measures with patient and family. Provide visual clues: red socks.

## 2020-11-20 ENCOUNTER — APPOINTMENT (OUTPATIENT)
Dept: CARDIOLOGY | Facility: CLINIC | Age: 85
End: 2020-11-20
Payer: MEDICARE

## 2020-11-20 ENCOUNTER — NON-APPOINTMENT (OUTPATIENT)
Age: 85
End: 2020-11-20

## 2020-11-20 VITALS
SYSTOLIC BLOOD PRESSURE: 132 MMHG | HEART RATE: 57 BPM | BODY MASS INDEX: 19.15 KG/M2 | HEIGHT: 67 IN | WEIGHT: 122 LBS | OXYGEN SATURATION: 96 % | DIASTOLIC BLOOD PRESSURE: 70 MMHG

## 2020-11-20 DIAGNOSIS — R00.1 BRADYCARDIA, UNSPECIFIED: ICD-10-CM

## 2020-11-20 DIAGNOSIS — I47.2 VENTRICULAR TACHYCARDIA: ICD-10-CM

## 2020-11-20 PROCEDURE — 93000 ELECTROCARDIOGRAM COMPLETE: CPT

## 2020-11-20 PROCEDURE — 99214 OFFICE O/P EST MOD 30 MIN: CPT

## 2020-11-20 RX ORDER — ATENOLOL 25 MG/1
25 TABLET ORAL DAILY
Refills: 0 | Status: DISCONTINUED | COMMUNITY
End: 2020-11-20

## 2020-11-20 NOTE — DISCUSSION/SUMMARY
[FreeTextEntry1] : 88-year-old with above medical history active medical problems as noted:\par \par #1 Bradycardia, accidentally took atenolol and diltiazem yesterday.  Mechanical fall in the past on [place].  Anxiety/confusional states on metoprolol and atenolol.  When on just diltiazem at low dose with no significant palpitation and stable blood pressure and heart rate. Recommend to discard atenolol so there is no confusion and restart diltiazem 15mg BID to avoid persistent bradycardia. \par \par #2 essential hypertension. controlled.  In presence of falls and complain of dizziness would not pursue any other medication in this elderly female.  Low salt diet. Continue salt and alcohol restriction. Understands relationship with hypertension and cardiovascular disease.\par \par #3 hyperlipidemia.   she Has decided again statin therapy.  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 No neurological event. Continue aspirin. Management of hypertension. She has decided against taking therapy \par \par Counseling regarding low saturated fat, salt and carbohydrate intake was reviewed. Active lifestyle and regular. Exercise along with weight management is advised.\par \par Sincerely,\par \par MONA Rodney\par Patients history, testing, and plan reviewed with supervising MD: Dr. Arley Llanes

## 2020-11-20 NOTE — REASON FOR VISIT
[Follow-Up - Clinic] : a clinic follow-up of [Dizziness] : dizziness [Hypertension] : hypertension [Medication Management] : Medication management [Family Member] : family member [FreeTextEntry1] : 88-year-old female is seen in the office for follow-up consultation. \par \par Sent from Dr. Louise whom she saw yesterday. EKG showed significant bradycardia. Seems pt had taken both atenolol and diltiazem together. Although atenolol was discontinued months ago d/t adverse effects. She was complaining of anxiety possibly associated with beta-blockers.  \par \par She was seen by Dr. Llanes 10/27 with lower dose of Cardizem and significantly less dizzy compared to before.  Past had a stay in [place] with a mechanical fall.  \par \par She has lost weight.\par She has not had any significant syncopal or near syncopal event\par No significant palpitations\par Her event recorder had shown short episodes of atrial tachycardia with aberrancy, and NSVT,\par Her activity level is stable.\par She has no complaint of chest pain at present. No PND, orthopnea, pedal edema.\par

## 2020-11-20 NOTE — PHYSICAL EXAM
[General Appearance - Well Developed] : well developed [Normal Appearance] : normal appearance [Well Groomed] : well groomed [General Appearance - Well Nourished] : well nourished [No Deformities] : no deformities [General Appearance - In No Acute Distress] : no acute distress [Normal Conjunctiva] : the conjunctiva exhibited no abnormalities [Eyelids - No Xanthelasma] : the eyelids demonstrated no xanthelasmas [] : no respiratory distress [Respiration, Rhythm And Depth] : normal respiratory rhythm and effort [Exaggerated Use Of Accessory Muscles For Inspiration] : no accessory muscle use [Heart Rate And Rhythm] : heart rate and rhythm were normal [Heart Sounds] : normal S1 and S2 [Arterial Pulses Normal] : the arterial pulses were normal [Edema] : no peripheral edema present [Veins - Varicosity Changes] : no varicosital changes were noted in the lower extremities [Abnormal Walk] : normal gait [Nail Clubbing] : no clubbing of the fingernails [Cyanosis, Localized] : no localized cyanosis [Skin Color & Pigmentation] : normal skin color and pigmentation [Oriented To Time, Place, And Person] : oriented to person, place, and time [Affect] : the affect was normal [Mood] : the mood was normal [No Anxiety] : not feeling anxious [FreeTextEntry1] : abimbola 1-2/6 at the base, no gallop/rub/heave or click. no bruits.

## 2020-11-20 NOTE — REVIEW OF SYSTEMS
[see HPI] : see HPI [Confusion] : confusion was observed [Anxiety] : anxiety [Under Stress] : under stress [Negative] : Heme/Lymph [Dizziness] : no dizziness [Tremor] : no tremor was seen [Numbness (Hypesthesia)] : no numbness [Convulsions] : no convulsions [Tingling (Paresthesia)] : no tingling [Memory Lapses Or Loss] : no memory lapses or loss [Depression] : no depression [Suicidal] : not suicidal

## 2020-11-20 NOTE — ASSESSMENT
[FreeTextEntry1] : past tests for reference:\par \par Nuclear stress test on May 27, 2014, no evidence of ischemia by EKG, normal myocardial perfusion and wall motion, no significant changes from the prior study of July 27, 2012.\par \par The patient was fitted with a 30-day event monitor to identify the arrhythmia burden. This was completed and revealed one episode of nonsustained ventricular tachycardia at the rate of 118 times 6 beats as well as one episode of paroxysmal supraventricular tachycardia also 5 to 6 beats, which was asymptomatic for the patient. There were otherwise isolated PVCs and APCs of no significance. The patient did not have any symptoms accompanying her nonsustained VT or PSVT. The monitoring period was performed between May 22, 2014 and June 23, 2014.\par \par 7/13/17 Carotid Doppler study mild atherosclerosis. Nonobstructive.\par 7/13/17 Echocardiogram preserved ejection fraction trace to mild mitral regurgitation. Normal chamber dimensions.\par 7/13/17 Abdominal aortic ultrasound. No aneurysm.\par \par Reviewed on May 16, 2018.\par EKG ordered and interpreted by me. May 16, 2018. Indication dizziness. Hypertension. Interpretation. In normal sinus rhythm. Incomplete right bundle branch block.\par \par Reviewed on July 11, 2019\par EKG normal sinus rhythm. Left anterior hemiblock, incomplete right bundle branch block.\par \par Reviewed on September 3, 2019\par Carotid Doppler study August 26, 2019 right ICA 50-69%.\par Echocardiogram August 26, 2008, ejection fraction 60%. Ascending aorta, 2.8 cm, normal left atrium minimal mitral regurgitation.\par 2 weeks, event recorder, multiple episodes of supraventricular tachycardia. The fastest was 19 beats at 261 beats per minute, lasting for about 14.8 seconds.\par \par Reviewed on October 2, 2019.\par Labs from August 27, 2019 showed stable. CBC, CMP, lipid panel, TSH, iron studies, B12 level, lyme titers.\par \par Repeat event recorder showed short runs of paroxysmal atrial tachycardia with aberrancy versus NSVt. Asymptomatic.\par \par Nuclear marker perfusion scan September 24, 2019 she was able to do 5 minutes and 30 seconds of Wellington protocol. 95% maximal heart rate. 3 METs of workload. Nonischemic perfusion scan overall. No significant ventricular arrhythmias with exercise.\par \par reviewed 8/6/20\par labs 7/23/20 stable cbc, cmpnegative lyme titres

## 2021-02-22 ENCOUNTER — NON-APPOINTMENT (OUTPATIENT)
Age: 86
End: 2021-02-22

## 2021-02-25 ENCOUNTER — APPOINTMENT (OUTPATIENT)
Dept: CARDIOLOGY | Facility: CLINIC | Age: 86
End: 2021-02-25

## 2021-04-29 ENCOUNTER — APPOINTMENT (OUTPATIENT)
Dept: CARDIOLOGY | Facility: CLINIC | Age: 86
End: 2021-04-29

## 2021-07-27 ENCOUNTER — NON-APPOINTMENT (OUTPATIENT)
Age: 86
End: 2021-07-27

## 2021-07-27 ENCOUNTER — APPOINTMENT (OUTPATIENT)
Dept: CARDIOLOGY | Facility: CLINIC | Age: 86
End: 2021-07-27
Payer: MEDICARE

## 2021-07-27 VITALS
DIASTOLIC BLOOD PRESSURE: 68 MMHG | BODY MASS INDEX: 18.52 KG/M2 | WEIGHT: 118 LBS | SYSTOLIC BLOOD PRESSURE: 118 MMHG | HEIGHT: 67 IN | HEART RATE: 72 BPM | OXYGEN SATURATION: 98 %

## 2021-07-27 DIAGNOSIS — R53.83 OTHER FATIGUE: ICD-10-CM

## 2021-07-27 DIAGNOSIS — R42 DIZZINESS AND GIDDINESS: ICD-10-CM

## 2021-07-27 PROCEDURE — 99214 OFFICE O/P EST MOD 30 MIN: CPT

## 2021-07-27 RX ORDER — ASPIRIN 81 MG/1
81 TABLET, COATED ORAL
Refills: 0 | Status: ACTIVE | COMMUNITY
Start: 2021-07-27

## 2021-07-27 NOTE — REVIEW OF SYSTEMS
[Feeling Fatigued] : feeling fatigued [Dyspnea on exertion] : dyspnea during exertion [Chest Discomfort] : no chest discomfort [Lower Ext Edema] : no extremity edema [Leg Claudication] : no intermittent leg claudication [Palpitations] : no palpitations [Orthopnea] : no orthopnea [PND] : no PND [Syncope] : no syncope [Joint Pain] : joint pain [Joint Stiffness] : joint stiffness [Dizziness] : dizziness [Tremor] : no tremor was seen [Numbness (Hypoesthesia)] : no numbness [Convulsions] : no convulsions [Tingling (Paresthesia)] : no tingling [Weakness] : no weakness [Limb Weakness (Paresis)] : no limb weakness (Paresis) [Speech Disturbance] : no speech disturbance [Negative] : Heme/Lymph

## 2021-07-27 NOTE — REASON FOR VISIT
[Family Member] : family member [Symptom and Test Evaluation] : symptom and test evaluation [Arrhythmia/ECG Abnorrmalities] : arrhythmia/ECG abnormalities [Other: ____] : [unfilled] [FreeTextEntry3] : Dr. Duran [FreeTextEntry1] : 89-year-old female is seen in the office for follow-up consultation after recent emergency room visit on July 10, 2021.  Reviewed information which includes ER data.  Labs.  EKG.  Chest x-ray.\par She had mild lightheadedness while playing bridge.  Lasted for few seconds.  Resolves on its own.  No other associated symptoms.  Her EKG showed normal sinus rhythm with PACs.  Her labs were stable including CBC CMP chest x-ray no active pulmonary disease.\par She has had similar symptoms about a year ago.  She denies any postural symptoms.  She drinks about 6 glasses of water a day\par She has not had any significant syncopal or near syncopal event\par No significant palpitations\par Her event recorder had shown short episodes of atrial tachycardia with aberrancy, and NSVT,\par Her activity level is stable.\par She has no complaint of chest pain at present. No PND, orthopnea, pedal edema.\par

## 2021-07-27 NOTE — PHYSICAL EXAM
[General Appearance - Well Developed] : well developed [Normal Appearance] : normal appearance [Well Groomed] : well groomed [General Appearance - Well Nourished] : well nourished [No Deformities] : no deformities [General Appearance - In No Acute Distress] : no acute distress [Normal Conjunctiva] : the conjunctiva exhibited no abnormalities [Eyelids - No Xanthelasma] : the eyelids demonstrated no xanthelasmas [] : no respiratory distress [Respiration, Rhythm And Depth] : normal respiratory rhythm and effort [Exaggerated Use Of Accessory Muscles For Inspiration] : no accessory muscle use [Heart Rate And Rhythm] : heart rate and rhythm were normal [Heart Sounds] : normal S1 and S2 [Arterial Pulses Normal] : the arterial pulses were normal [Edema] : no peripheral edema present [Veins - Varicosity Changes] : no varicosital changes were noted in the lower extremities [Abnormal Walk] : normal gait [Nail Clubbing] : no clubbing of the fingernails [Cyanosis, Localized] : no localized cyanosis [Skin Color & Pigmentation] : normal skin color and pigmentation [Affect] : the affect was normal [Oriented To Time, Place, And Person] : oriented to person, place, and time [Mood] : the mood was normal [No Anxiety] : not feeling anxious [FreeTextEntry1] : abimbola 1-2/6 at the base, no gallop/rub/heave or click. no bruits.

## 2021-07-27 NOTE — DISCUSSION/SUMMARY
[FreeTextEntry1] : 89-year-old with above medical history active medical problems as noted.  \par #1  Intermittent dizziness.  No postural changes in blood pressure heart rate.  No orthostasis symptoms.\par Once a year.  No associated syncope.  Recommended continue fluid intake.  Avoid quick changes in position.\par We will do carotid Doppler study to evaluate for carotid or vertebral arteries.  Will do echocardiogram to evaluate for LV ejection fraction and valvular evaluation.\par If recurrent worsening symptoms will discuss further regarding event monitoring or implantable loop recorder.\par Unable to discontinue diltiazem considering it is helping with her significantly symptomatic PSVT and PAT at a very low dose.  \par #2 essential hypertension.stable without orthostasis.  In presence of falls and complain of dizziness would not pursue any other medication in this elderly female.  Continue beta-blockers for management of atrial arrhythmias.  Low salt diet.\par Continue salt and alcohol restriction.Understands relationship with hypertension and cardiovascular disease.\par #3 hyperlipidemia.   she Has decided again statin therapy.  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 No neurological event. Continue aspirin. Management of hypertension. She has decided against taking statin therapy \par \par Counseling regarding low saturated fat, salt and carbohydrate intake was reviewed. Active lifestyle and regular. Exercise along with weight management is advised.\par \par All the above were at length reviewed. Answered all the questions. Thank you very much for this kind referral. Please do not hesitate to give me a call for any question.\par Part of this transcription was done with voice recognition software and phonetically similar errors are common. I apologize for that. Please donot hesitate to call for any questions due to above.\par Followup is planned otherwise in 3 months.

## 2021-07-27 NOTE — ASSESSMENT
[FreeTextEntry1] : past tests for reference:\par \par Nuclear stress test on May 27, 2014, no evidence of ischemia by EKG, normal myocardial perfusion and wall motion, no significant changes from the prior study of July 27, 2012.\par \par The patient was fitted with a 30-day event monitor to identify the arrhythmia burden. This was completed and revealed one episode of nonsustained ventricular tachycardia at the rate of 118 times 6 beats as well as one episode of paroxysmal supraventricular tachycardia also 5 to 6 beats, which was asymptomatic for the patient. There were otherwise isolated PVCs and APCs of no significance. The patient did not have any symptoms accompanying her nonsustained VT or PSVT. The monitoring period was performed between May 22, 2014 and June 23, 2014.\par \par 7/13/17 Carotid Doppler study mild atherosclerosis. Nonobstructive.\par 7/13/17 Echocardiogram preserved ejection fraction trace to mild mitral regurgitation. Normal chamber dimensions.\par 7/13/17 Abdominal aortic ultrasound. No aneurysm.\par \par Reviewed on May 16, 2018.\par EKG ordered and interpreted by me. May 16, 2018. Indication dizziness. Hypertension. Interpretation. In normal sinus rhythm. Incomplete right bundle branch block.\par \par Reviewed on July 11, 2019\par EKG normal sinus rhythm. Left anterior hemiblock, incomplete right bundle branch block.\par \par Reviewed on September 3, 2019\par Carotid Doppler study August 26, 2019 right ICA 50-69%.\par Echocardiogram August 26, 2008, ejection fraction 60%. Ascending aorta, 2.8 cm, normal left atrium minimal mitral regurgitation.\par 2 weeks, event recorder, multiple episodes of supraventricular tachycardia. The fastest was 19 beats at 261 beats per minute, lasting for about 14.8 seconds.\par \par Reviewed on October 2, 2019.\par Labs from August 27, 2019 showed stable. CBC, CMP, lipid panel, TSH, iron studies, B12 level, lyme titers.\par Repeat event recorder showed short runs of paroxysmal atrial tachycardia with aberrancy versus NSVt. Asymptomatic.\par Nuclear marker perfusion scan September 24, 2019 she was able to do 5 minutes and 30 seconds of Wellington protocol. 95% maximal heart rate. 3 METs of workload. Nonischemic perfusion scan overall. No significant ventricular arrhythmias with exercise.\par \par reviewed 8/6/20\par labs 7/23/20 stable cbc, cmpnegative lyme titres\par \par Reviewed on July 27, 2021.  CBC CMP EKG chest x-ray and ER data were reviewed

## 2021-08-12 ENCOUNTER — APPOINTMENT (OUTPATIENT)
Dept: CARDIOLOGY | Facility: CLINIC | Age: 86
End: 2021-08-12

## 2021-08-31 ENCOUNTER — APPOINTMENT (OUTPATIENT)
Dept: CARDIOLOGY | Facility: CLINIC | Age: 86
End: 2021-08-31

## 2021-10-01 ENCOUNTER — APPOINTMENT (OUTPATIENT)
Dept: CARDIOLOGY | Facility: CLINIC | Age: 86
End: 2021-10-01
Payer: MEDICARE

## 2021-10-01 PROCEDURE — 93880 EXTRACRANIAL BILAT STUDY: CPT

## 2021-10-01 PROCEDURE — 93306 TTE W/DOPPLER COMPLETE: CPT

## 2021-10-19 ENCOUNTER — APPOINTMENT (OUTPATIENT)
Dept: CARDIOLOGY | Facility: CLINIC | Age: 86
End: 2021-10-19

## 2021-10-19 ENCOUNTER — NON-APPOINTMENT (OUTPATIENT)
Age: 86
End: 2021-10-19

## 2021-12-12 ENCOUNTER — RX RENEWAL (OUTPATIENT)
Age: 86
End: 2021-12-12

## 2022-07-28 ENCOUNTER — NON-APPOINTMENT (OUTPATIENT)
Age: 87
End: 2022-07-28

## 2022-08-03 ENCOUNTER — APPOINTMENT (OUTPATIENT)
Dept: CARDIOLOGY | Facility: CLINIC | Age: 87
End: 2022-08-03
Payer: MEDICARE

## 2022-08-03 ENCOUNTER — APPOINTMENT (OUTPATIENT)
Dept: CARDIOLOGY | Facility: CLINIC | Age: 87
End: 2022-08-03

## 2022-08-03 ENCOUNTER — NON-APPOINTMENT (OUTPATIENT)
Age: 87
End: 2022-08-03

## 2022-08-03 VITALS
SYSTOLIC BLOOD PRESSURE: 110 MMHG | DIASTOLIC BLOOD PRESSURE: 62 MMHG | BODY MASS INDEX: 18.83 KG/M2 | HEIGHT: 67 IN | OXYGEN SATURATION: 97 % | HEART RATE: 73 BPM | WEIGHT: 120 LBS

## 2022-08-03 DIAGNOSIS — E78.5 HYPERLIPIDEMIA, UNSPECIFIED: ICD-10-CM

## 2022-08-03 DIAGNOSIS — I10 ESSENTIAL (PRIMARY) HYPERTENSION: ICD-10-CM

## 2022-08-03 DIAGNOSIS — I65.23 OCCLUSION AND STENOSIS OF BILATERAL CAROTID ARTERIES: ICD-10-CM

## 2022-08-03 DIAGNOSIS — I47.1 SUPRAVENTRICULAR TACHYCARDIA: ICD-10-CM

## 2022-08-03 PROCEDURE — 99214 OFFICE O/P EST MOD 30 MIN: CPT

## 2022-08-03 PROCEDURE — 93000 ELECTROCARDIOGRAM COMPLETE: CPT

## 2022-08-03 RX ORDER — DILTIAZEM HYDROCHLORIDE 30 MG/1
30 TABLET ORAL TWICE DAILY
Qty: 180 | Refills: 3 | Status: DISCONTINUED | COMMUNITY
Start: 2020-08-12 | End: 2022-08-03

## 2022-08-03 NOTE — REASON FOR VISIT
[Symptom and Test Evaluation] : symptom and test evaluation [Arrhythmia/ECG Abnorrmalities] : arrhythmia/ECG abnormalities [Other: ____] : [unfilled] [Family Member] : family member [FreeTextEntry3] : Dr. Duran [FreeTextEntry1] : 89-year-old female is seen in office for follow-up consultation.  Last seen July 27, 2021 she was in [place] for last few months.  She has not had any significant cardiac problems.  No hospital admission from cardiac point of view.\par She has not had any significant syncopal or near syncopal event\par No significant palpitations\par Her activity level is stable.\par She has no complaint of chest pain at present. No PND, orthopnea, pedal edema.\par

## 2022-08-03 NOTE — PHYSICAL EXAM
[General Appearance - Well Developed] : well developed [Normal Appearance] : normal appearance [Well Groomed] : well groomed [General Appearance - Well Nourished] : well nourished [No Deformities] : no deformities [General Appearance - In No Acute Distress] : no acute distress [] : no respiratory distress [Respiration, Rhythm And Depth] : normal respiratory rhythm and effort [Exaggerated Use Of Accessory Muscles For Inspiration] : no accessory muscle use [Heart Rate And Rhythm] : heart rate and rhythm were normal [Heart Sounds] : normal S1 and S2 [Arterial Pulses Normal] : the arterial pulses were normal [Edema] : no peripheral edema present [Veins - Varicosity Changes] : no varicosital changes were noted in the lower extremities [Abnormal Walk] : normal gait [Nail Clubbing] : no clubbing of the fingernails [Cyanosis, Localized] : no localized cyanosis [FreeTextEntry1] : abimbola 1-2/6 at the base, no gallop/rub/heave or click. no bruits.

## 2022-08-03 NOTE — CARDIOLOGY SUMMARY
[___] : [unfilled] [de-identified] : July 10, 2021 normal sinus rhythm with PACs.\par August 3, 2022.  EKG shows normal sinus rhythm left anterior hemiblock. [de-identified] : October 1, 2021 EF 60% mild mitral regurgitation normal left atrium mild tricuspid regurgitation [de-identified] : Bilateral carotid Doppler study October 1, 2021 mild nonobstructive disease

## 2022-08-03 NOTE — ASSESSMENT
[FreeTextEntry1] : past tests for reference:\par \par Nuclear stress test on May 27, 2014, no evidence of ischemia by EKG, normal myocardial perfusion and wall motion, no significant changes from the prior study of July 27, 2012.\par \par The patient was fitted with a 30-day event monitor to identify the arrhythmia burden. This was completed and revealed one episode of nonsustained ventricular tachycardia at the rate of 118 times 6 beats as well as one episode of paroxysmal supraventricular tachycardia also 5 to 6 beats, which was asymptomatic for the patient. There were otherwise isolated PVCs and APCs of no significance. The patient did not have any symptoms accompanying her nonsustained VT or PSVT. The monitoring period was performed between May 22, 2014 and June 23, 2014.\par \par 7/13/17 Carotid Doppler study mild atherosclerosis. Nonobstructive.\par 7/13/17 Echocardiogram preserved ejection fraction trace to mild mitral regurgitation. Normal chamber dimensions.\par 7/13/17 Abdominal aortic ultrasound. No aneurysm.\par \par Reviewed on May 16, 2018.\par EKG ordered and interpreted by me. May 16, 2018. Indication dizziness. Hypertension. Interpretation. In normal sinus rhythm. Incomplete right bundle branch block.\par \par Reviewed on July 11, 2019\par EKG normal sinus rhythm. Left anterior hemiblock, incomplete right bundle branch block.\par \par Reviewed on September 3, 2019\par Carotid Doppler study August 26, 2019 right ICA 50-69%.\par Echocardiogram August 26, 2008, ejection fraction 60%. Ascending aorta, 2.8 cm, normal left atrium minimal mitral regurgitation.\par 2 weeks, event recorder, multiple episodes of supraventricular tachycardia. The fastest was 19 beats at 261 beats per minute, lasting for about 14.8 seconds.\par \par Reviewed on October 2, 2019.\par Labs from August 27, 2019 showed stable. CBC, CMP, lipid panel, TSH, iron studies, B12 level, lyme titers.\par Repeat event recorder showed short runs of paroxysmal atrial tachycardia with aberrancy versus NSVt. Asymptomatic.\par Nuclear marker perfusion scan September 24, 2019 she was able to do 5 minutes and 30 seconds of Wellington protocol. 95% maximal heart rate. 3 METs of workload. Nonischemic perfusion scan overall. No significant ventricular arrhythmias with exercise.\par \par reviewed 8/6/20\par labs 7/23/20 stable cbc, cmpnegative lyme titres\par \par Reviewed on July 27, 2021.  CBC CMP EKG chest x-ray and ER data were reviewed\par \par Reviewed August 3, 2022.  EKG as noted above.  Echocardiogram and carotid Doppler study from October were reviewed

## 2022-08-03 NOTE — DISCUSSION/SUMMARY
[FreeTextEntry1] : 89-year-old with above medical history active medical problems as noted.  \par #1  Intermittent dizziness.  No postural changes in blood pressure heart rate.  No orthostasis symptoms.\par  Recommended continue fluid intake.  Avoid quick changes in position.\par No significant recent fall near syncopal or syncopal event\par Carotids nonobstructive\par Echocardiogram preserved EF.\par Continue to follow\par #2 essential hypertension.stable without orthostasis.  In presence of falls and complain of dizziness would not pursue any other medication in this elderly female.  Continue to stay off medication\par Continue salt and alcohol restriction.Understands relationship with hypertension and cardiovascular disease.\par #3 hyperlipidemia.   she Has decided again statin therapy.  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 No neurological event. Continue aspirin. Management of hypertension. She has decided against taking statin therapy \par \par Counseling regarding low saturated fat, salt and carbohydrate intake was reviewed. Active lifestyle and regular. Exercise along with weight management is advised.\par \par All the above were at length reviewed. Answered all the questions. Thank you very much for this kind referral. Please do not hesitate to give me a call for any question.\par Part of this transcription was done with voice recognition software and phonetically similar errors are common. I apologize for that. Please donot hesitate to call for any questions due to above.\par \par \par Sincerely,\par Arley Llanes MD,FACC,[place]\par

## 2022-08-03 NOTE — REVIEW OF SYSTEMS
[Feeling Fatigued] : feeling fatigued [Dyspnea on exertion] : dyspnea during exertion [Joint Pain] : joint pain [Joint Stiffness] : joint stiffness [Dizziness] : dizziness [Negative] : Heme/Lymph [Chest Discomfort] : no chest discomfort [Lower Ext Edema] : no extremity edema [Leg Claudication] : no intermittent leg claudication [Palpitations] : no palpitations [Orthopnea] : no orthopnea [PND] : no PND [Syncope] : no syncope [Tremor] : no tremor was seen [Numbness (Hypoesthesia)] : no numbness [Convulsions] : no convulsions [Tingling (Paresthesia)] : no tingling [Weakness] : no weakness [Limb Weakness (Paresis)] : no limb weakness (Paresis) [Speech Disturbance] : no speech disturbance

## 2022-08-24 ENCOUNTER — NON-APPOINTMENT (OUTPATIENT)
Age: 87
End: 2022-08-24

## 2022-10-26 ENCOUNTER — APPOINTMENT (OUTPATIENT)
Dept: CARDIOLOGY | Facility: CLINIC | Age: 87
End: 2022-10-26

## 2022-10-26 NOTE — REASON FOR VISIT
[Symptom and Test Evaluation] : symptom and test evaluation [Arrhythmia/ECG Abnorrmalities] : arrhythmia/ECG abnormalities [Other: ____] : [unfilled] [FreeTextEntry3] : Dr. Duran [FreeTextEntry1] : 89-year-old female is seen in office for follow-up consultation.  Last seen July 27, 2021 she was in [place] for last few months.  She has not had any significant cardiac problems.  No hospital admission from cardiac point of view.\par She has not had any significant syncopal or near syncopal event\par No significant palpitations\par Her activity level is stable.\par She has no complaint of chest pain at present. No PND, orthopnea, pedal edema.\par  [Family Member] : family member

## 2022-10-26 NOTE — PHYSICAL EXAM
[General Appearance - Well Developed] : well developed [Well Groomed] : well groomed [Normal Appearance] : normal appearance [General Appearance - Well Nourished] : well nourished [No Deformities] : no deformities [General Appearance - In No Acute Distress] : no acute distress [] : no respiratory distress [Respiration, Rhythm And Depth] : normal respiratory rhythm and effort [Exaggerated Use Of Accessory Muscles For Inspiration] : no accessory muscle use [Heart Rate And Rhythm] : heart rate and rhythm were normal [Heart Sounds] : normal S1 and S2 [Arterial Pulses Normal] : the arterial pulses were normal [Edema] : no peripheral edema present [Veins - Varicosity Changes] : no varicosital changes were noted in the lower extremities [FreeTextEntry1] : abimbola 1-2/6 at the base, no gallop/rub/heave or click. no bruits. [Abnormal Walk] : normal gait [Nail Clubbing] : no clubbing of the fingernails [Cyanosis, Localized] : no localized cyanosis

## 2022-10-26 NOTE — CARDIOLOGY SUMMARY
[de-identified] : July 10, 2021 normal sinus rhythm with PACs.\par August 3, 2022.  EKG shows normal sinus rhythm left anterior hemiblock. [de-identified] : October 1, 2021 EF 60% mild mitral regurgitation normal left atrium mild tricuspid regurgitation [de-identified] : Bilateral carotid Doppler study October 1, 2021 mild nonobstructive disease [___] : [unfilled]

## 2022-11-01 ENCOUNTER — APPOINTMENT (OUTPATIENT)
Dept: CARDIOLOGY | Facility: CLINIC | Age: 87
End: 2022-11-01

## 2023-02-17 NOTE — PHYSICAL EXAM
----- Message from Neel Khan MD sent at 2/16/2023  6:18 PM CST -----  Regarding: Home Care  Contact: Nurse  please sched patient for a visit to discuss that further plus home care requires a face to face visit anyway  KARTHIK Tavarez  ----- Message -----  From: Dora Hamilton MA  Sent: 2/16/2023   3:57 PM CST  To: Neel Khan MD      ----- Message -----  From: Lesvia Duval  Sent: 2/16/2023   3:55 PM CST  To: [facility] Staff    Type:  Needs Medical Advice    Who Called: Patient's Family       Would the patient rather a call back or a response via [facility]? Call    Best Call Back Number: [phone]    Additional Information:  Hospice was ordered and the patient does not qualify. Family is requesting a home health order instead through [facility]. [phone] ( Fax ATTN TO Fernanda )             [General Appearance - Well Developed] : well developed [General Appearance - In No Acute Distress] : no acute distress [Normal Conjunctiva] : the conjunctiva exhibited no abnormalities [No Oral Pallor] : no oral pallor [Normal Jugular Venous V Waves Present] : normal jugular venous V waves present [Respiration, Rhythm And Depth] : normal respiratory rhythm and effort [Auscultation Breath Sounds / Voice Sounds] : lungs were clear to auscultation bilaterally [Heart Rate And Rhythm] : heart rate and rhythm were normal [Heart Sounds] : normal S1 and S2 [Murmurs] : no murmurs present [Arterial Pulses Normal] : the arterial pulses were normal [Edema] : no peripheral edema present [Abdomen Tenderness] : non-tender [Abnormal Walk] : normal gait [Nail Clubbing] : no clubbing of the fingernails [No Venous Stasis] : no venous stasis [Cyanosis, Localized] : no localized cyanosis [Impaired Insight] : insight and judgment were intact [FreeTextEntry1] : Faint carotid bruit on the right side